# Patient Record
Sex: FEMALE | Race: WHITE | URBAN - METROPOLITAN AREA
[De-identification: names, ages, dates, MRNs, and addresses within clinical notes are randomized per-mention and may not be internally consistent; named-entity substitution may affect disease eponyms.]

---

## 2023-09-22 ENCOUNTER — OFFICE VISIT (OUTPATIENT)
Age: 18
End: 2023-09-22
Payer: COMMERCIAL

## 2023-09-22 VITALS
HEART RATE: 63 BPM | TEMPERATURE: 98.4 F | BODY MASS INDEX: 24.7 KG/M2 | WEIGHT: 125.8 LBS | RESPIRATION RATE: 16 BRPM | HEIGHT: 60 IN

## 2023-09-22 DIAGNOSIS — M54.50 LUMBAR PAIN: ICD-10-CM

## 2023-09-22 DIAGNOSIS — M79.18 MYOFASCIAL PAIN: ICD-10-CM

## 2023-09-22 DIAGNOSIS — M41.125 ADOLESCENT IDIOPATHIC SCOLIOSIS OF THORACOLUMBAR REGION: Primary | ICD-10-CM

## 2023-09-22 PROCEDURE — 99204 OFFICE O/P NEW MOD 45 MIN: CPT | Performed by: PHYSICAL MEDICINE & REHABILITATION

## 2023-09-22 RX ORDER — MEDROXYPROGESTERONE ACETATE 150 MG/ML
150 INJECTION, SUSPENSION INTRAMUSCULAR
COMMUNITY

## 2023-09-22 ASSESSMENT — ENCOUNTER SYMPTOMS
WHEEZING: 0
TROUBLE SWALLOWING: 0
SHORTNESS OF BREATH: 0
NAUSEA: 0
BACK PAIN: 1
VOMITING: 0

## 2023-12-26 ENCOUNTER — TELEPHONE (OUTPATIENT)
Age: 18
End: 2023-12-26

## 2023-12-26 NOTE — TELEPHONE ENCOUNTER
Patient called for Dr. Cruz.    Patient said that  ordered an mri of her Lumbar Spine. Patient said that her insurance will only allow her to have the mri done at [facility].    Patient is asking that the mri order of her lumbar spine be faxed to [facility]   Fax # [phone]   Tel. [phone]    Patient tel. [phone].

## 2023-12-26 NOTE — TELEPHONE ENCOUNTER
I tried to contact the pt. She was not able to be reached. A message was left for the pt letting her know that we can fax the order over to [facility] as requested. I did make the pt aware that this may take a couple of days. The number to the office was provided.

## 2024-01-03 DIAGNOSIS — M54.50 LUMBAR PAIN: ICD-10-CM

## 2024-01-03 DIAGNOSIS — M41.125 ADOLESCENT IDIOPATHIC SCOLIOSIS OF THORACOLUMBAR REGION: ICD-10-CM

## 2024-01-03 DIAGNOSIS — M79.18 MYOFASCIAL PAIN: ICD-10-CM

## 2024-01-03 NOTE — TELEPHONE ENCOUNTER
Records faxed to [facility] to authorize and schedule. See referral and media for details.

## 2024-02-28 NOTE — PROGRESS NOTES
[facility]  [address], [address]  [place] [address]  Phone: [phone]  Fax: [phone]      Brenda Woods  : 2005  PCP: Unknown, Provider, DO  3/14/2024    PROGRESS NOTE    HISTORY OF PRESENT ILLNESS    21  C/o lumbar pain x 6 months, and also intermittent numbness and tingling in the back of her legs when sitting. Notes tenderness of lumbar region (R>L). Notes she has had increased episodes of frequent urination.   Seen by urgent care center a few weeks ago, because of a flare-up of lumbar pain.   She was dx'd with scoliosis back in 2016, where her physicians had provided a brace for her to wear at night for a year.   PT attended through [facility], which typically involved walking, biking and [place] twists during sessions.   Notes she has Scoliosis XR images from [facility] from 2017, but was unable to bring them to the office with her today. She provided images for us to view through her phone. Scoliosis XR Images from 2017 revealed Moreno angle of 42 degrees in lumbar spine, and 22 degrees in thoracic spine.   She previously worked in a job where she had to run and walk around a lot, which exacerbated her pain by the end of the day. 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 She currently exercises by walking daily with her dog.     21  Continues with lumbar pain.   Entire Spine XR dated 10/4/23 was reviewed. Per report, S-shaped rotoscoliosis of the thoracolumbar spine.   PT (10/2023-2023; [facility]) continued, where pt notes she has found the most relief with manual techniques such as massage therapy; pt notes that PT found she had anterior pelvic tilt and they have been focusing on stretches to address it.   She also notes of new symptom of ha and migraines, where she will have difficulty tolerating bright light and ha that radiates across her forehead.

## 2024-03-14 ENCOUNTER — OFFICE VISIT (OUTPATIENT)
Age: 19
End: 2024-03-14
Payer: COMMERCIAL

## 2024-03-14 VITALS — HEIGHT: 60 IN | RESPIRATION RATE: 16 BRPM | BODY MASS INDEX: 24.76 KG/M2

## 2024-03-14 DIAGNOSIS — M79.18 MYOFASCIAL PAIN: ICD-10-CM

## 2024-03-14 DIAGNOSIS — M41.125 ADOLESCENT IDIOPATHIC SCOLIOSIS OF THORACOLUMBAR REGION: Primary | ICD-10-CM

## 2024-03-14 DIAGNOSIS — M24.9 HYPERMOBILITY OF JOINT: ICD-10-CM

## 2024-03-14 DIAGNOSIS — M54.50 LUMBAR PAIN: ICD-10-CM

## 2024-03-14 PROCEDURE — 99213 OFFICE O/P EST LOW 20 MIN: CPT | Performed by: PHYSICAL MEDICINE & REHABILITATION

## 2024-03-14 RX ORDER — RIZATRIPTAN BENZOATE 10 MG/1
TABLET ORAL
COMMUNITY
Start: 2024-01-15

## 2024-03-14 RX ORDER — GABAPENTIN 100 MG/1
CAPSULE ORAL
COMMUNITY
Start: 2024-01-15

## 2024-03-14 ASSESSMENT — ENCOUNTER SYMPTOMS
WHEEZING: 0
NAUSEA: 0
TROUBLE SWALLOWING: 0
SHORTNESS OF BREATH: 0
BACK PAIN: 1
VOMITING: 0

## 2024-03-14 NOTE — PATIENT INSTRUCTIONS
Asim Jean Baptiste's Big three exercises link:  [email or web address]

## 2024-04-04 ENCOUNTER — TELEPHONE (OUTPATIENT)
Age: 19
End: 2024-04-04

## 2024-04-04 NOTE — TELEPHONE ENCOUNTER
Patient wants to know if Dr. Cruz referred her to a kidney specialist when he referred her to Dr. Jones or will Dr. Jones see her for her Kidney also.    Patient tel [phone]

## 2024-04-04 NOTE — TELEPHONE ENCOUNTER
Called to speak to pt about her question- no answer and unable to LVM as mailbox was full    Dr. Jones will not see her for kidney issue, she needs to go to the specialist that she was referred to for her kidney

## 2024-04-15 ENCOUNTER — OFFICE VISIT (OUTPATIENT)
Age: 19
End: 2024-04-15
Payer: COMMERCIAL

## 2024-04-15 VITALS — BODY MASS INDEX: 25.72 KG/M2 | WEIGHT: 131 LBS | HEIGHT: 60 IN

## 2024-04-15 DIAGNOSIS — M99.07 UPPER EXTREMITY SOMATIC DYSFUNCTION: ICD-10-CM

## 2024-04-15 DIAGNOSIS — M99.06 LOWER LIMB REGION SOMATIC DYSFUNCTION: ICD-10-CM

## 2024-04-15 DIAGNOSIS — S39.012A LUMBAR STRAIN, INITIAL ENCOUNTER: ICD-10-CM

## 2024-04-15 DIAGNOSIS — M99.05 PELVIC REGION SOMATIC DYSFUNCTION: ICD-10-CM

## 2024-04-15 DIAGNOSIS — M41.35 THORACOGENIC SCOLIOSIS OF THORACOLUMBAR REGION: Primary | ICD-10-CM

## 2024-04-15 DIAGNOSIS — M99.01 CERVICAL SOMATIC DYSFUNCTION: ICD-10-CM

## 2024-04-15 DIAGNOSIS — M99.08 RIB CAGE REGION SOMATIC DYSFUNCTION: ICD-10-CM

## 2024-04-15 DIAGNOSIS — M99.02 THORACIC REGION SOMATIC DYSFUNCTION: ICD-10-CM

## 2024-04-15 DIAGNOSIS — M99.03 SOMATIC DYSFUNCTION OF LUMBAR REGION: ICD-10-CM

## 2024-04-15 DIAGNOSIS — M99.04 SACRAL REGION SOMATIC DYSFUNCTION: ICD-10-CM

## 2024-04-15 DIAGNOSIS — M99.09 SOMATIC DYSFUNCTION OF ABDOMINAL REGION: ICD-10-CM

## 2024-04-15 PROCEDURE — 98929 OSTEOPATH MANJ 9-10 REGIONS: CPT | Performed by: FAMILY MEDICINE

## 2024-04-15 PROCEDURE — 99204 OFFICE O/P NEW MOD 45 MIN: CPT | Performed by: FAMILY MEDICINE

## 2024-04-15 NOTE — PATIENT INSTRUCTIONS
Either scan this QR code on your smartphone:        Or search YouTube for my channel:    Dr. LIZY Jones    Low back/Piriformis    Neck/Upper back

## 2024-04-15 NOTE — PROGRESS NOTES
AVS reviewed: yes,   HEP: AT reviewed  Resources Provided: yes,  YouTube Videos  Patient questions/concerns answered: yes,   Patient verbalized understanding of treatment plan: yes,     
    Patient (or guardian if minor) verbalizes understanding of evaluation and plan.    Verbal consent obtained.  Cervical, Thoracic, Rib, Lumbar, Pelvic, Sacral, Upper Ext, Lower Ext, and Abdominal SD treated with Myofascial and ME.  Correction of previous malalignments verified after Tx.    Pt tolerated well.  Notes improvement of Sx and pain is now rated 0/10.    HEP/stretches daily. Discussed stretching/strengthening/posture.    Will continue HEP and start Voltaren 50mg PRN as above and plan follow-up as needed. Discussed next option likely testing for connective tissue disorder at tertiary center.

## 2024-05-16 ENCOUNTER — TELEPHONE (OUTPATIENT)
Age: 19
End: 2024-05-16

## 2024-05-16 DIAGNOSIS — M24.9 HYPERMOBILITY OF JOINT: ICD-10-CM

## 2024-05-16 DIAGNOSIS — M41.35 THORACOGENIC SCOLIOSIS OF THORACOLUMBAR REGION: Primary | ICD-10-CM

## 2024-05-16 NOTE — TELEPHONE ENCOUNTER
Patient is requesting to be referred to a different doctor to have an eval for  hypermobility. Patient states she was dissatisfied with the doctor she was previously referred to.     Patient can be reached at [phone].

## 2024-05-17 NOTE — TELEPHONE ENCOUNTER
Spoke to Dr. Cruz in regards to this message and he gave a verbal order to Dr. Rene López, [facility]  [address]  [address]  [facility] [address] Loc/POS:                  Phone:   [phone] Phone:     Fax:   [phone]         Called patient [phone] and verified her name and date of birth. I informed her Dr. Cruz reviewed her message and gave a verbal order for a new referral to Dr. López. And we are sorry she had a bad experience with Dr. Jones. She stated he did not feel like he listened to her and he prescribed her a medicine that she told him she did not want and she had to wait to speak to the nurse to understand what was going on. I informed her that we will put in the referral for the testing for connective tissue disorder. Patient verbalized understanding and no further action needed at this time.